# Patient Record
Sex: MALE | Race: WHITE
[De-identification: names, ages, dates, MRNs, and addresses within clinical notes are randomized per-mention and may not be internally consistent; named-entity substitution may affect disease eponyms.]

---

## 2019-06-22 ENCOUNTER — HOSPITAL ENCOUNTER (EMERGENCY)
Dept: HOSPITAL 25 - ED | Age: 29
Discharge: HOME | End: 2019-06-22
Payer: COMMERCIAL

## 2019-06-22 VITALS — SYSTOLIC BLOOD PRESSURE: 124 MMHG | DIASTOLIC BLOOD PRESSURE: 76 MMHG

## 2019-06-22 DIAGNOSIS — F43.9: Primary | ICD-10-CM

## 2019-06-22 DIAGNOSIS — F17.210: ICD-10-CM

## 2019-06-22 DIAGNOSIS — F43.20: ICD-10-CM

## 2019-06-22 PROCEDURE — 99282 EMERGENCY DEPT VISIT SF MDM: CPT

## 2019-06-22 NOTE — ED
Psychiatric Complaint





- HPI Summary


HPI Summary: 


29 year old M brought in by police to [facility] with a chief complaint of being 

anxious, depressed, and angry since two hours ago. Symptoms aggravated by 

nothing. Symptoms alleviated by nothing. Patient reports lower back pain. He 

also reports right hand pain. Patient denies suicidal ideation, homicidal 

ideation. Patient has hx anxiety and depression but he is not taking 

psychiatric medication. Patient states he called 911 this morning to be 

escorted to his house and the police brought him to ED for stating that he is 

going to do something crazy. Patient states he is upset.





- History Of Current Complaint


Chief Complaint: EDMentalHealth


Time Seen by Provider: 06/22/19 09:36


Hx Obtained From: Patient


Onset/Duration: Lasting Hours, Still Present


Timing: Constant


Aggravating Factor(s): Nothing


Alleviating Factor(s): Nothing


Has Suicidal: Denies: Thoughts


Has Homicidal: Denies: Thoughts





- Allergies/Home Medications


Allergies/Adverse Reactions: 


 Allergies











Allergy/AdvReac Type Severity Reaction Status Date / Time


 


No Known Allergies Allergy   Verified 06/22/19 09:33











Home Medications: 


 Home Medications





NK [No Home Medications Reported]  06/22/19 [History Confirmed 06/22/19]











PMH/Surg Hx/FS Hx/Imm Hx


Previously Healthy: No


Sensory History: 


   Denies: Hx Legally Blind, Hx Deafness


Opthamlomology History: 


   Denies: Hx Legally Blind


EENT History: 


   Denies: Hx Deafness


Psychiatric History: Reports: Hx Anxiety, Hx Depression





- Surgical History


Surgery Procedure, Year, and Place: None reported


Infectious Disease History: No


Infectious Disease History: 


   Denies: Traveled Outside the [place] in Last 30 Days





- Family History


Family History: Maternal grandmother had quadruple bypass





- Social History


Alcohol Use: Weekly


Hx Substance Use: Yes


Substance Use Type: Reports: Marijuana


Hx Tobacco Use: Yes


Smoking Status (MU): Light Every Day Tobacco Smoker





Review of Systems


Positive: Other - right hand pain, low back pain


Positive: Anxious, Depressed, Other - anger; NEG: SI, HI


All Other Systems Reviewed And Are Negative: Yes





Physical Exam





- Summary


Physical Exam Summary: 


VITAL SIGNS: Reviewed.


GENERAL: Patient is a well-developed and nourished MALE who is lying 

comfortable in the stretcher. Patient is not in any acute respiratory distress.


HEAD AND FACE: No signs of trauma. No ecchymosis, hematomas or skull 

depressions. No sinus tenderness.


EYES: PERRLA, EOMI x 2, No injected conjunctiva, no nystagmus.


EARS: Hearing grossly intact. Ear canals and tympanic membranes are within 

normal limits.


MOUTH: Oropharynx within normal limits.


NECK: Supple, trachea is midline, no adenopathy, no JVD, no carotid bruit, no c-

spine tenderness, neck with full ROM.


CHEST: Symmetric, no tenderness at palpation


LUNGS: Clear to auscultation bilaterally. No wheezing or crackles.


CVS: Regular rate and rhythm, S1 and S2 present, no murmurs or gallops 

appreciated.


ABDOMEN: Soft, non-tender. No signs of distention. No rebound no guarding, and 

no masses palpated. Bowel sounds are normal.


EXTREMITIES: FROM in all major joints, no edema, no cyanosis or clubbing.


NEURO: Alert and oriented x 3. No acute neurological deficits. Speech is normal 

and follows commands.


SKIN: Dry and warm.


PSYCH: Depressed, quiet, and denies any suicidal thoughts or plan. No homicidal 

thoughts or plan. No signs of psychosis or pressure speech. No tangential 

speech.


Triage Information Reviewed: Yes


Vital Signs On Initial Exam: 


 Initial Vitals











Temp Pulse Resp BP Pulse Ox


 


 98.3 F   59   16   146/86   99 


 


 06/22/19 09:27  06/22/19 09:27  06/22/19 09:27  06/22/19 09:27  06/22/19 09:27











Vital Signs Reviewed: Yes





Diagnostics





- Vital Signs


 Vital Signs











  Temp Pulse Resp BP Pulse Ox


 


 06/22/19 09:27  98.3 F  59  16  146/86  99














- Laboratory


Lab Statement: Any lab studies that have been ordered have been reviewed, and 

results considered in the medical decision making process.





Course/Dx





- Course


Assessment/Plan: 29 year old M brought in by police to [facility] with a chief 

complaint of being anxious, depressed, and angry since two hours ago. Symptoms 

aggravated by nothing. Symptoms alleviated by nothing. Patient reports lower 

back pain. He also reports right hand pain. Patient denies suicidal ideation, 

homicidal ideation. Patient has hx anxiety and depression but he is not taking 

psychiatric medication. Patient states he called 911 this morning to be 

escorted to his house and the police brought him to ED for stating that he is 

going to do something crazy. Patient states he is upset.  The patient declined x

-rays of his back and right hand.  Mental health evaluator Karlos spoke with Dr. Ren, psychiatry. They state that the patient will be discharged home.  The 

patient will be discharged home and referred to primary care provider for follow

-up in 3 days.  At discharge, the patient does not have any other complaints.  

Patient was instructed to return to the emergency room if he develops any other 

symptoms.  He understands and agrees.





- Differential Dx/Clinical Impression


Provider Diagnosis: 


 Stress and adjustment reaction








Discharge





- Sign-Out/Discharge


Documenting (check all that apply): Patient Departure - Discharge


Patient Received Moderate/Deep Sedation with Procedure: No





- Discharge Plan


Condition: Stable


Disposition: HOME


Patient Education Materials:  Stress (ED)


Referrals: 


No Primary Care Phys,NOPCP [Primary Care Provider] - 





- Billing Disposition and Condition


Condition: STABLE


Disposition: Home





- Attestation Statements


Document Initiated by Kirk: Yes


Documenting Scribe: Leona Barrios


Provider For Whom Kacieibe is Documenting (Include Credential): Hernandez Michel MD


Scribe Attestation: 


Leona ARREAGA scribed for Hernandez Michel MD on 06/22/19 at 1102. 


Scribe Documentation Reviewed: Yes


Provider Attestation: 


The documentation as recorded by the Leona beckford accurately reflects 

the service I personally performed and the decisions made by Hernandez galan MD


Status of Scribe Document: Viewed